# Patient Record
Sex: MALE | Race: WHITE | Employment: UNEMPLOYED | URBAN - METROPOLITAN AREA
[De-identification: names, ages, dates, MRNs, and addresses within clinical notes are randomized per-mention and may not be internally consistent; named-entity substitution may affect disease eponyms.]

---
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
## 2021-10-05 ENCOUNTER — OFFICE VISIT (OUTPATIENT)
Dept: FAMILY MEDICINE CLINIC | Age: 21
End: 2021-10-05

## 2021-10-05 VITALS
BODY MASS INDEX: 28.22 KG/M2 | HEART RATE: 96 BPM | DIASTOLIC BLOOD PRESSURE: 82 MMHG | SYSTOLIC BLOOD PRESSURE: 124 MMHG | HEIGHT: 71 IN | WEIGHT: 201.6 LBS | OXYGEN SATURATION: 96 %

## 2021-10-05 DIAGNOSIS — F33.2 SEVERE EPISODE OF RECURRENT MAJOR DEPRESSIVE DISORDER, WITHOUT PSYCHOTIC FEATURES (HCC): ICD-10-CM

## 2021-10-05 DIAGNOSIS — N50.89 MASS OF RIGHT TESTICLE: Primary | ICD-10-CM

## 2021-10-05 DIAGNOSIS — N50.811 RIGHT TESTICULAR PAIN: ICD-10-CM

## 2021-10-05 PROCEDURE — 99214 OFFICE O/P EST MOD 30 MIN: CPT | Performed by: PHYSICIAN ASSISTANT

## 2021-10-05 ASSESSMENT — PATIENT HEALTH QUESTIONNAIRE - PHQ9
SUM OF ALL RESPONSES TO PHQ9 QUESTIONS 1 & 2: 4
5. POOR APPETITE OR OVEREATING: 1
8. MOVING OR SPEAKING SO SLOWLY THAT OTHER PEOPLE COULD HAVE NOTICED. OR THE OPPOSITE, BEING SO FIGETY OR RESTLESS THAT YOU HAVE BEEN MOVING AROUND A LOT MORE THAN USUAL: 3
6. FEELING BAD ABOUT YOURSELF - OR THAT YOU ARE A FAILURE OR HAVE LET YOURSELF OR YOUR FAMILY DOWN: 3
10. IF YOU CHECKED OFF ANY PROBLEMS, HOW DIFFICULT HAVE THESE PROBLEMS MADE IT FOR YOU TO DO YOUR WORK, TAKE CARE OF THINGS AT HOME, OR GET ALONG WITH OTHER PEOPLE: 1
SUM OF ALL RESPONSES TO PHQ QUESTIONS 1-9: 20
9. THOUGHTS THAT YOU WOULD BE BETTER OFF DEAD, OR OF HURTING YOURSELF: 1
SUM OF ALL RESPONSES TO PHQ QUESTIONS 1-9: 20
3. TROUBLE FALLING OR STAYING ASLEEP: 3
SUM OF ALL RESPONSES TO PHQ QUESTIONS 1-9: 19
1. LITTLE INTEREST OR PLEASURE IN DOING THINGS: 2
7. TROUBLE CONCENTRATING ON THINGS, SUCH AS READING THE NEWSPAPER OR WATCHING TELEVISION: 3
4. FEELING TIRED OR HAVING LITTLE ENERGY: 2
2. FEELING DOWN, DEPRESSED OR HOPELESS: 2

## 2021-10-05 ASSESSMENT — COLUMBIA-SUICIDE SEVERITY RATING SCALE - C-SSRS
1. WITHIN THE PAST MONTH, HAVE YOU WISHED YOU WERE DEAD OR WISHED YOU COULD GO TO SLEEP AND NOT WAKE UP?: YES
2. HAVE YOU ACTUALLY HAD ANY THOUGHTS OF KILLING YOURSELF?: NO
6. HAVE YOU EVER DONE ANYTHING, STARTED TO DO ANYTHING, OR PREPARED TO DO ANYTHING TO END YOUR LIFE?: NO

## 2021-10-05 NOTE — PATIENT INSTRUCTIONS
If you have not heard from anyone by Monday 10/11/21, please call Central Scheduling at  to schedule your ultrasound. [address], [facility], [address] [facility]  Monday through Friday 7:00am  5:00pm   Saturday 8:00am  12:00pm     Try taking Ibuprofen 600-800 mg twice daily (at hours apart) X 4-5 days.

## 2021-10-05 NOTE — PROGRESS NOTES
10/5/2021    Latisha Dyers    Chief Complaint   Patient presents with    Other     pt states knot ( pea size ) on testicle during sex then go away after ejaculation       HPI  History obtained from the patient. Baldev is a 22 y.o. male who presents today for testicular lump X 1 month. Testicular Lump - Patient complains right testicular pain and an intermittent lump to right scrotum X 1 month. He states that he can only palpate the lump during sex and masterbation but after ejaculation, it goes away. He states that the lump is \"pea-sized\" and is on the underside of his right scrotum. Notes that there is a small bump to the skin overlying this area, but he denies drainage from this. He states that whether the lump is palpable or not, the area is sore, and he gets \"stinging pain\" when it's touched. States that he has had one new sex partner in the last month, but this was occurring before they started having intercourse. Denies blisters or penile discharge. Depression - Patient notes he was in therapy age [age], [facility] at [facility]. He was on Zoloft at one point but stopped this shortly after starting it because, \"It just made me not fell anything. \" He stopped taking it about a year ago. He states that his depression affects his interest and motivation at work. \"I have really bad looping thoughts. \" He quit his job in August and is now looking for a job. He states that if his depression gets  \"I think it's manageable. \"     PHQ-9 Total Score: 20 (10/5/2021 11:14 AM)  Thoughts that you would be better off dead, or of hurting yourself in some way: 1 (10/5/2021 11:14 AM)    REVIEW OF SYMPTOMS  Review of Systems   Constitutional: Negative for chills and fever. Respiratory: Negative for cough and shortness of breath. Gastrointestinal: Negative for diarrhea and vomiting.      PAST MEDICAL HISTORY  Past Medical History:   Diagnosis Date    Bell's palsy 06/23/2020    Depression     LUIS (generalized anxiety disorder)     History of MRSA infection        FAMILY HISTORY  Family History   Problem Relation Age of Onset    Hypertension Mother     Osteoarthritis Mother    Alicia Damon Migraines Mother     Thyroid Disease Mother     Hypertension Sister     High Cholesterol Sister     Diabetes Sister     Asthma Maternal Grandmother     Hypertension Maternal Grandfather     High Cholesterol Maternal Grandfather     Diabetes Maternal Grandfather     Diabetes Maternal Uncle     Asthma Maternal Uncle     Diabetes Maternal Aunt     Diabetes Maternal Cousin     Cancer Maternal Great Grandmother     Cancer Maternal Great Grandfather     Asthma Maternal Great Grandmother        SOCIAL HISTORY  Social History     Socioeconomic History    Marital status: Single     Spouse name: Not on file    Number of children: Not on file    Years of education: Not on file    Highest education level: Not on file   Occupational History    Not on file   Tobacco Use    Smoking status: Never Smoker    Smokeless tobacco: Never Used   Substance and Sexual Activity    Alcohol use: Not on file    Drug use: Not on file    Sexual activity: Not on file   Other Topics Concern    Not on file   Social History Narrative    Not on file     Social Determinants of Health     Financial Resource Strain:     Difficulty of Paying Living Expenses:    Food Insecurity:     Worried About Running Out of Food in the Last Year:     [address] in the Last Year:    Transportation Needs:     Lack of Transportation (Medical):      Lack of Transportation (Non-Medical):    Physical Activity:     Days of Exercise per Week:     Minutes of Exercise per Session:    Stress:     Feeling of Stress :    Social Connections:     Frequency of Communication with Friends and Family:     Frequency of Social Gatherings with Friends and Family:     Attends Oriental orthodox Services:     Active Member of Clubs or Organizations:     Attends Club or Organization Meetings:     Marital Status:    Intimate Partner Violence:     Fear of Current or Ex-Partner:     Emotionally Abused:     Physically Abused:     Sexually Abused:         SURGICAL HISTORY  Past Surgical History:   Procedure Laterality Date    CYST INCISION AND DRAINAGE Right 2010    MRSA infection       CURRENT MEDICATIONS  No current outpatient medications on file. No current facility-administered medications for this visit. ALLERGIES  Allergies   Allergen Reactions    Amoxicillin Hives       RECENT LABS    No results found for: LABA1C  No results found for: EAG    No results found for: CHOL  No results found for: LDLCHOLESTEROL, LDLCALC    No results found for: WBC, HGB, HCT, MCV, PLT    PHYSICAL EXAM  /82   Pulse 96   Ht 5' 11\" (1.803 m)   Wt 201 lb 9.6 oz (91.4 kg)   SpO2 96%   BMI 28.12 kg/m²     Physical Exam  Constitutional:       Appearance: Normal appearance. HENT:      Head: Normocephalic and atraumatic. Eyes:      Comments: EOM grossly intact. Cardiovascular:      Rate and Rhythm: Normal rate and regular rhythm. Heart sounds: No murmur heard. No friction rub. No gallop. Pulmonary:      Effort: Pulmonary effort is normal.      Breath sounds: Normal breath sounds. No wheezing, rhonchi or rales. Genitourinary:     Comments: Chaperone present (Jameson SEGOVIA). Circumcised male. No penile discharge or lesions. No scrotal swelling or discoloration. Testes descended bilaterally, smooth, no masses. TTP to inferior right testicle but no palpable masses. Skin:     General: Skin is warm and dry. Neurological:      Mental Status: He is alert and oriented to person, place, and time. Comments: Cranial nerves II-XII grossly intact   Psychiatric:         Mood and Affect: Mood normal.         Behavior: Behavior normal.         ASSESSMENT & PLAN  1. Mass of right testicle  Will obtain US and advise based on findings. - US SCROTUM AND TESTICLES; Future    2.  Right testicular pain  Will obtain US and advise based on findings. - US SCROTUM AND TESTICLES; Future    3. Severe episode of recurrent major depressive disorder, without psychotic features [facility])  Patient politely declines referral for counseling and treatment with medication at this time. I encouraged the patient to let me know if he becomes interested in this, as I will be happy to help. No follow-ups on file.             Electronically signed by Estela Shultz PA-C on 10/5/2021

## 2021-10-12 ENCOUNTER — HOSPITAL ENCOUNTER (OUTPATIENT)
Dept: ULTRASOUND IMAGING | Age: 21
Discharge: HOME | End: 2021-10-12
Payer: COMMERCIAL

## 2021-10-12 DIAGNOSIS — N50.89 MASS OF RIGHT TESTICLE: ICD-10-CM

## 2021-10-12 DIAGNOSIS — N50.811 RIGHT TESTICULAR PAIN: ICD-10-CM

## 2021-10-12 PROCEDURE — 76870 US EXAM SCROTUM: CPT

## 2021-10-12 PROCEDURE — 93975 VASCULAR STUDY: CPT

## 2021-10-13 ENCOUNTER — TELEPHONE (OUTPATIENT)
Dept: FAMILY MEDICINE CLINIC | Age: 21
End: 2021-10-13

## 2021-10-13 NOTE — TELEPHONE ENCOUNTER
Please let patient know congratulations! The ultrasound did find a small benign area correlating to an area of tenderness. It is called a varicocele which is benign. There is no sign of cancer. Nothing is needed to be done. If symptoms occur, consider heating pad for 30 minutes but is usually not necessary. Can discuss more at his next follow-up. The studies were ordered by [employer].

## 2021-10-13 NOTE — TELEPHONE ENCOUNTER
----- Message from Frank Kay sent at 10/12/2021  3:09 PM EDT -----  Subject: Results Request    QUESTIONS  Which lab or imaging result is the patient calling about? X Ray   Which provider ordered the test? Rebekah Jimenes   At what location was the test performed? Date the test was performed? 2021-10-12  Additional Information for Provider? Patient is calling to request a call   in regards to his X-ray results as soon as they are received by the PCP.   ---------------------------------------------------------------------------  --------------  CALL BACK INFO  What is the best way for the office to contact you? OK to leave message on   voicemail, OK to respond with electronic message via [facility] portal (only   for patients who have registered [facility] account)  Preferred Call Back Phone Number?  [phone]

## 2021-10-14 ENCOUNTER — TELEPHONE (OUTPATIENT)
Dept: FAMILY MEDICINE CLINIC | Age: 21
End: 2021-10-14

## 2021-10-14 DIAGNOSIS — I86.1 RIGHT VARICOCELE: Primary | ICD-10-CM

## 2021-10-14 NOTE — TELEPHONE ENCOUNTER
Call patient with US results 10-12-21  Patient is aware that provider will not be in the office until Monday 10-18-21

## 2021-10-14 NOTE — TELEPHONE ENCOUNTER
Please let the patient know that there is a small varicocele on the right testicle present with Valsalva. I will send a referral to urology as he may need semen analysis for infertility.

## 2021-10-19 ENCOUNTER — TELEPHONE (OUTPATIENT)
Dept: FAMILY MEDICINE CLINIC | Age: 21
End: 2021-10-19

## 2021-10-19 DIAGNOSIS — I86.1 VARICOCELE: Primary | ICD-10-CM

## 2021-10-19 NOTE — RESULT ENCOUNTER NOTE
Please call the patient and let him know that I reviewed the results of his ultrasound. It showed a very mild varicocele on the right (less than 3 mm). A varicocele is a dilation of a vein (kind of like a varicose vein). I am going to refer him to a urologist just to be safe, because varicoceles do have the possibility of impacting fertility (though this is correctable with a procedure). However, his varicocele is so mild that a procedure may not even be necessary. We will let the specialist decide.     If the patient has not heard from the urology office within the next week to get an appointment scheduled, he should call himself and ask to schedule an appointment:[facility] - Nubia Restrepo MD  [address] [place] [address], [address] [place] [address]  [phone]

## 2022-03-18 ENCOUNTER — NURSE TRIAGE (OUTPATIENT)
Dept: OTHER | Facility: CLINIC | Age: 22
End: 2022-03-18

## 2022-03-18 ENCOUNTER — OFFICE VISIT (OUTPATIENT)
Dept: FAMILY MEDICINE CLINIC | Age: 22
End: 2022-03-18
Payer: COMMERCIAL

## 2022-03-18 VITALS
OXYGEN SATURATION: 99 % | SYSTOLIC BLOOD PRESSURE: 122 MMHG | WEIGHT: 190 LBS | HEART RATE: 59 BPM | BODY MASS INDEX: 26.6 KG/M2 | DIASTOLIC BLOOD PRESSURE: 78 MMHG | HEIGHT: 71 IN

## 2022-03-18 DIAGNOSIS — F33.1 MODERATE EPISODE OF RECURRENT MAJOR DEPRESSIVE DISORDER (HCC): Primary | ICD-10-CM

## 2022-03-18 PROBLEM — F33.2 SEVERE EPISODE OF RECURRENT MAJOR DEPRESSIVE DISORDER, WITHOUT PSYCHOTIC FEATURES (HCC): Status: RESOLVED | Noted: 2021-10-05 | Resolved: 2022-03-18

## 2022-03-18 PROCEDURE — 99203 OFFICE O/P NEW LOW 30 MIN: CPT | Performed by: FAMILY MEDICINE

## 2022-03-18 RX ORDER — FLUOXETINE HYDROCHLORIDE 20 MG/1
20 CAPSULE ORAL DAILY
Qty: 30 CAPSULE | Refills: 3 | Status: SHIPPED | OUTPATIENT
Start: 2022-03-18 | End: 2022-08-22

## 2022-03-18 SDOH — ECONOMIC STABILITY: FOOD INSECURITY: WITHIN THE PAST 12 MONTHS, THE FOOD YOU BOUGHT JUST DIDN'T LAST AND YOU DIDN'T HAVE MONEY TO GET MORE.: NEVER TRUE

## 2022-03-18 SDOH — ECONOMIC STABILITY: FOOD INSECURITY: WITHIN THE PAST 12 MONTHS, YOU WORRIED THAT YOUR FOOD WOULD RUN OUT BEFORE YOU GOT MONEY TO BUY MORE.: NEVER TRUE

## 2022-03-18 ASSESSMENT — SOCIAL DETERMINANTS OF HEALTH (SDOH): HOW HARD IS IT FOR YOU TO PAY FOR THE VERY BASICS LIKE FOOD, HOUSING, MEDICAL CARE, AND HEATING?: NOT HARD AT ALL

## 2022-03-18 NOTE — PROGRESS NOTES
3/18/2022    Author Bravo    Chief Complaint   Patient presents with    Depression     pt describes \"nothing is enjoyable, insomnia, fatigue, unhappiness\"    Anxiety     worried not doing good enough job, sometimes waking up with heart palpitations       HPI    Baldev is a 22 y.o. male who presents today with follow-up. 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 Patient not actively suicidal.  Patient was given a same-day appointment with me. I know his mother Jeremy Kirk. Patient notes that he had been on Zoloft as a teenager. He did not stay on it long. He felt that had some mild side effects. 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 He has not done a, the sets in the number of months now although he used to enjoy it and get an adrenaline from being successful at. Patient believes there is psychologic disease in his genetic family although he feels that none are specifically diagnosed.     REVIEW OF SYSTEMS    Constitutional:  Denies fever, chills, weight loss or weakness  Eyes:  no photophobia or discharge  ENT:  no sore throat or ear pain  Cardiovascular:  Denies chest pain, palpitations or swelling  Respiratory:  Denies cough or shortness of breath  GI:  no abdominal pain, nausea, vomiting, or diarrhea  Musculoskeletal:  no back pain  Skin:  No rashes  Neurologic:  no headache, focal weakness, or sensory changes  Endocrine:  no polyuria or polydipsia      PAST MEDICAL HISTORY  Past Medical History:   Diagnosis Date    Bell's palsy 06/23/2020    Depression     LUIS (generalized anxiety disorder)     History of MRSA infection        FAMILY HISTORY  Family History   Problem Relation Age of Onset    Hypertension Mother     Osteoarthritis Mother    [facility] Migraines Mother     Thyroid Disease Mother     Hypertension Sister     High Cholesterol Sister     Diabetes Sister     Asthma Maternal Grandmother     Hypertension Maternal Grandfather     High Cholesterol Maternal Grandfather     Diabetes Maternal Grandfather     Diabetes Maternal Uncle     Asthma Maternal Uncle     Diabetes Maternal Aunt     Diabetes Maternal Cousin     Cancer Maternal Great Grandmother     Cancer Maternal Great Grandfather     Asthma Maternal Great Grandmother        SOCIAL HISTORY  Social History     Socioeconomic History    Marital status: Single     Spouse name: Not on file    Number of children: Not on file    Years of education: Not on file    Highest education level: Not on file   Occupational History    Not on file   Tobacco Use    Smoking status: Never Smoker    Smokeless tobacco: Never Used   Substance and Sexual Activity    Alcohol use: Not on file    Drug use: Not on file    Sexual activity: Not on file   Other Topics Concern    Not on file   Social History Narrative    Not on file     Social Determinants of Health     Financial Resource Strain: Low Risk     Difficulty of Paying Living Expenses: Not hard at all   Food Insecurity: No Food Insecurity    Worried About Running Out of Food in the Last Year: Never true    [address] in the Last Year: Never true   Transportation Needs:     Lack of Transportation (Medical): Not on file    Lack of Transportation (Non-Medical):  Not on file   Physical Activity:     Days of Exercise per Week: Not on file    Minutes of Exercise per Session: Not on file   Stress:     Feeling of Stress : Not on file   Social Connections:     Frequency of Communication with Friends and Family: Not on file    Frequency of Social Gatherings with Friends and Family: Not on file    Attends Christian Services: Not on file    Active Member of Clubs or Organizations: Not on file    Attends Club or Organization Meetings: Not on file    Marital Status: Not on file   Intimate Partner Violence:     Fear of Current or Ex-Partner: Not on file    Emotionally Abused: Not on file    Physically Abused: Not on file    Sexually Abused: Not on file   Housing Stability:     Unable to Pay for Housing in the Last Year: Not on file    Number of Places Lived in the Last Year: Not on file    Unstable Housing in the Last Year: Not on file        SURGICAL HISTORY  Past Surgical History:   Procedure Laterality Date    CYST INCISION AND DRAINAGE Right 2010    MRSA infection       CURRENT MEDICATIONS  Current Outpatient Medications   Medication Sig Dispense Refill    FLUoxetine (PROZAC) 20 MG capsule Take 1 capsule by mouth daily 30 capsule 3     No current facility-administered medications for this visit. ALLERGIES  Allergies   Allergen Reactions    Amoxicillin Hives       PHYSICAL EXAM  /78   Pulse 59   Ht 5' 11\" (1.803 m)   Wt 190 lb (86.2 kg)   SpO2 99%   BMI 26.50 kg/m²     ASSESSMENT & PLAN    1. Moderate episode of recurrent major depressive disorder [facility])  Patient has a good relationship with his mom. Patient is encouraged to speak with his mom regularly about the medication and whether he is improving  Patient cautioned about a suicide rate with SSRIs in young people  Patient feels he could use more motivation  Start Prozac    Patient also interested in counseling  Consult counselor Dr. Shirlene Banda    - FLUoxetine (PROZAC) 20 MG capsule; Take 1 capsule by mouth daily  Dispense: 30 capsule;  Refill: 3  - [address] [place]    Follow-up in 4 weeks with Michelle Bonner       Electronically signed by Jose Angel Masters MD on 3/18/2022

## 2022-03-18 NOTE — TELEPHONE ENCOUNTER
Received call from Shantel Dears at [facility] with Red Flag Complaint. Subjective: Caller states \"right now stable but been feeling down and have been having thoughts of hurting self at times but not now, no plans\"     Current Symptoms:emotionles all the time, \"nothing makes me happy\" started getting bad since november last year, been on and off feeling this way since 16 in various degree, could be having a great day but then have a passing thought of harming self, no thought of hurting self now, denies preoccupation, no plans, no thought of hurting others, \"brain fog\", impacting ability to think about what makes caller genuinely happy. Family is very supportive and understanding. Used to have a therapist, [address] [place] [address] at  [facility] but stopped going when caller was 18 yo because thought he was better. Denies any substance use. Denies thoughts of harming others. Onset: a few months ago; sudden, intermittent, waxing and waning    Associated Symptoms: increased wakefulness    Pain Severity: Denies    Temperature: Denies     What has been tried: n/a    LMP: NA Pregnant: No    Recommended disposition: See in Office Today, advised caller if unable to get an appointment in the suggested time frame to go to an [facility] or ED, caller agreeable. Care advice provided, patient verbalizes understanding; denies any other questions or concerns; instructed to call back for any new or worsening symptoms. Patient/Caller agrees with recommended disposition; writer provided warm transfer to [employer] at [facility] for appointment scheduling     Attention Provider: Thank you for allowing me to participate in the care of your patient. The patient was connected to triage in response to information provided to the ECC/PSC. Please do not respond through this encounter as the response is not directed to a shared pool.     Reason for Disposition   Sometimes has thoughts of suicide    Protocols used: DEPRESSION-ADULT-[place]

## 2022-04-15 ENCOUNTER — OFFICE VISIT (OUTPATIENT)
Dept: FAMILY MEDICINE CLINIC | Age: 22
End: 2022-04-15
Payer: COMMERCIAL

## 2022-04-15 VITALS
BODY MASS INDEX: 25.93 KG/M2 | OXYGEN SATURATION: 96 % | RESPIRATION RATE: 16 BRPM | HEIGHT: 71 IN | HEART RATE: 73 BPM | SYSTOLIC BLOOD PRESSURE: 116 MMHG | DIASTOLIC BLOOD PRESSURE: 86 MMHG | WEIGHT: 185.2 LBS

## 2022-04-15 DIAGNOSIS — F33.1 MODERATE EPISODE OF RECURRENT MAJOR DEPRESSIVE DISORDER (HCC): Primary | ICD-10-CM

## 2022-04-15 PROCEDURE — 99213 OFFICE O/P EST LOW 20 MIN: CPT | Performed by: PHYSICIAN ASSISTANT

## 2022-04-15 NOTE — PROGRESS NOTES
on file   Occupational History    Not on file   Tobacco Use    Smoking status: Never Smoker    Smokeless tobacco: Never Used   Substance and Sexual Activity    Alcohol use: Not on file    Drug use: Not on file    Sexual activity: Not on file   Other Topics Concern    Not on file   Social History Narrative    Not on file     Social Determinants of Health     Financial Resource Strain: Low Risk     Difficulty of Paying Living Expenses: Not hard at all   Food Insecurity: No Food Insecurity    Worried About Running Out of Food in the Last Year: Never true    [address] in the Last Year: Never true   Transportation Needs:     Lack of Transportation (Medical): Not on file    Lack of Transportation (Non-Medical):  Not on file   Physical Activity:     Days of Exercise per Week: Not on file    Minutes of Exercise per Session: Not on file   Stress:     Feeling of Stress : Not on file   Social Connections:     Frequency of Communication with Friends and Family: Not on file    Frequency of Social Gatherings with Friends and Family: Not on file    Attends Cheondoism Services: Not on file    Active Member of [address] [place] [address] or Organizations: Not on file    Attends Club or Organization Meetings: Not on file    Marital Status: Not on file   Intimate Partner Violence:     Fear of Current or Ex-Partner: Not on file    Emotionally Abused: Not on file    Physically Abused: Not on file    Sexually Abused: Not on file   Housing Stability:     Unable to Pay for Housing in the Last Year: Not on file    Number of [place] in the Last Year: Not on file    Unstable Housing in the Last Year: Not on file        SURGICAL HISTORY  Past Surgical History:   Procedure Laterality Date    CYST INCISION AND DRAINAGE Right 2010    MRSA infection                 CURRENT MEDICATIONS  Current Outpatient Medications   Medication Sig Dispense Refill    FLUoxetine (PROZAC) 20 MG capsule Take 1 capsule by mouth daily 30 capsule 3 No current facility-administered medications for this visit. ALLERGIES  Allergies   Allergen Reactions    Amoxicillin Hives       PHYSICAL EXAM    /86 (Site: Right Upper Arm, Position: Sitting, Cuff Size: Medium Adult)   Pulse 73   Resp 16   Ht 5' 11\" (1.803 m)   Wt 185 lb 3.2 oz (84 kg)   SpO2 96%   BMI 25.83 kg/m²     Physical Exam  Constitutional:       Appearance: Normal appearance. He is normal weight. HENT:      Head: Normocephalic and atraumatic. Right Ear: Tympanic membrane and external ear normal.      Left Ear: Tympanic membrane and external ear normal.      Nose: No rhinorrhea. Mouth/Throat:      Mouth: Mucous membranes are moist.      Pharynx: Oropharynx is clear. No oropharyngeal exudate or posterior oropharyngeal erythema. Eyes:      General: No scleral icterus. Extraocular Movements: Extraocular movements intact. Conjunctiva/sclera: Conjunctivae normal.      Pupils: Pupils are equal, round, and reactive to light. Cardiovascular:      Rate and Rhythm: Normal rate and regular rhythm. Pulses: Normal pulses. Heart sounds: Normal heart sounds. No murmur heard. No friction rub. No gallop. Pulmonary:      Effort: Pulmonary effort is normal.      Breath sounds: Normal breath sounds. No wheezing, rhonchi or rales. Abdominal:      General: Bowel sounds are normal. There is no distension. Palpations: Abdomen is soft. There is no mass. Tenderness: There is no abdominal tenderness. There is no right CVA tenderness, left CVA tenderness, guarding or rebound. Musculoskeletal:         General: No deformity. Normal range of motion. Cervical back: Normal range of motion and neck supple. No rigidity. No muscular tenderness. Right lower leg: No edema. Left lower leg: No edema. Skin:     General: Skin is warm and dry. Capillary Refill: Capillary refill takes less than 2 seconds. Findings: No bruising, erythema or rash. Neurological:      General: No focal deficit present. Mental Status: He is alert and oriented to person, place, and time. Coordination: Coordination normal.      Gait: Gait normal.   Psychiatric:         Mood and Affect: Mood normal.         Behavior: Behavior normal.         ASSESSMENT & PLAN    1. Moderate episode of recurrent major depressive disorder (HCC)  Partial remission, patient to continue with Prozac  Recommend NSAIDs for headaches    Return in about 6 months (around 10/15/2022), or new to provider with Kelsie Lara. Electronically signed by Jane Edmonds on 4/15/2022      Comment: Please note this report has been produced using speech recognition software and may contain errors related to that system including errors in grammar, punctuation, and spelling, as well as words and phrases that may be inappropriate. If there are any questions or concerns please feel free to contact the dictating provider for clarification.

## 2022-08-20 DIAGNOSIS — F33.1 MODERATE EPISODE OF RECURRENT MAJOR DEPRESSIVE DISORDER (HCC): ICD-10-CM

## 2022-08-22 RX ORDER — FLUOXETINE HYDROCHLORIDE 20 MG/1
CAPSULE ORAL
Qty: 30 CAPSULE | Refills: 2 | Status: SHIPPED | OUTPATIENT
Start: 2022-08-22